# Patient Record
Sex: MALE | Race: AMERICAN INDIAN OR ALASKA NATIVE
[De-identification: names, ages, dates, MRNs, and addresses within clinical notes are randomized per-mention and may not be internally consistent; named-entity substitution may affect disease eponyms.]

---

## 2017-09-13 ENCOUNTER — HOSPITAL ENCOUNTER (OUTPATIENT)
Dept: HOSPITAL 42 - ENDO | Age: 72
Discharge: HOME | End: 2017-09-13
Attending: SPECIALIST
Payer: MEDICARE

## 2017-09-13 VITALS — TEMPERATURE: 97.9 F

## 2017-09-13 VITALS
HEART RATE: 53 BPM | DIASTOLIC BLOOD PRESSURE: 75 MMHG | OXYGEN SATURATION: 98 % | SYSTOLIC BLOOD PRESSURE: 169 MMHG | RESPIRATION RATE: 19 BRPM

## 2017-09-13 VITALS — BODY MASS INDEX: 22.7 KG/M2

## 2017-09-13 DIAGNOSIS — E78.5: ICD-10-CM

## 2017-09-13 DIAGNOSIS — Z12.11: Primary | ICD-10-CM

## 2017-09-13 DIAGNOSIS — K55.20: ICD-10-CM

## 2017-09-13 DIAGNOSIS — I10: ICD-10-CM

## 2017-09-13 DIAGNOSIS — K57.30: ICD-10-CM

## 2017-09-13 DIAGNOSIS — I25.10: ICD-10-CM

## 2017-09-13 DIAGNOSIS — K64.8: ICD-10-CM

## 2017-09-13 DIAGNOSIS — Z95.1: ICD-10-CM

## 2017-09-13 DIAGNOSIS — D64.9: ICD-10-CM

## 2017-09-13 PROCEDURE — 45378 DIAGNOSTIC COLONOSCOPY: CPT

## 2018-07-23 ENCOUNTER — HOSPITAL ENCOUNTER (OUTPATIENT)
Dept: HOSPITAL 42 - CATH | Age: 73
LOS: 1 days | Discharge: HOME | End: 2018-07-24
Attending: INTERNAL MEDICINE
Payer: MEDICARE

## 2018-07-23 VITALS — BODY MASS INDEX: 22.7 KG/M2

## 2018-07-23 VITALS — RESPIRATION RATE: 20 BRPM

## 2018-07-23 DIAGNOSIS — E78.00: ICD-10-CM

## 2018-07-23 DIAGNOSIS — I73.9: ICD-10-CM

## 2018-07-23 DIAGNOSIS — I10: ICD-10-CM

## 2018-07-23 DIAGNOSIS — N40.0: ICD-10-CM

## 2018-07-23 DIAGNOSIS — Z87.891: ICD-10-CM

## 2018-07-23 DIAGNOSIS — I25.110: Primary | ICD-10-CM

## 2018-07-23 DIAGNOSIS — Z79.899: ICD-10-CM

## 2018-07-23 DIAGNOSIS — K21.9: ICD-10-CM

## 2018-07-23 DIAGNOSIS — Z95.1: ICD-10-CM

## 2018-07-23 DIAGNOSIS — Z79.02: ICD-10-CM

## 2018-07-23 DIAGNOSIS — Z82.49: ICD-10-CM

## 2018-07-23 DIAGNOSIS — Z79.82: ICD-10-CM

## 2018-07-23 DIAGNOSIS — R94.39: ICD-10-CM

## 2018-07-23 LAB
APTT BLD: 27.4 SECONDS (ref 25.1–36.5)
BUN SERPL-MCNC: 8 MG/DL (ref 7–21)
CALCIUM SERPL-MCNC: 9.1 MG/DL (ref 8.4–10.5)
ERYTHROCYTE [DISTWIDTH] IN BLOOD BY AUTOMATED COUNT: 13.9 % (ref 11.5–14.5)
GFR NON-AFRICAN AMERICAN: > 60
HGB BLD-MCNC: 12.9 G/DL (ref 14–18)
INR PPP: 1.14 (ref 0.93–1.08)
MCH RBC QN AUTO: 31.9 PG (ref 25–35)
MCHC RBC AUTO-ENTMCNC: 34.8 G/DL (ref 31–37)
MCV RBC AUTO: 91.6 FL (ref 80–105)
PLATELET # BLD: 187 10^3/UL (ref 120–450)
PMV BLD AUTO: 8.7 FL (ref 7–11)
PROTHROMBIN TIME: 13.2 SECONDS (ref 9.4–12.5)
RBC # BLD AUTO: 4.05 10^6/UL (ref 3.5–6.1)
WBC # BLD AUTO: 5.8 10^3/UL (ref 4.5–11)

## 2018-07-23 PROCEDURE — 93459 L HRT ART/GRFT ANGIO: CPT

## 2018-07-23 PROCEDURE — 80048 BASIC METABOLIC PNL TOTAL CA: CPT

## 2018-07-23 PROCEDURE — 93005 ELECTROCARDIOGRAM TRACING: CPT

## 2018-07-23 PROCEDURE — 99152 MOD SED SAME PHYS/QHP 5/>YRS: CPT

## 2018-07-23 PROCEDURE — 86900 BLOOD TYPING SEROLOGIC ABO: CPT

## 2018-07-23 PROCEDURE — 86850 RBC ANTIBODY SCREEN: CPT

## 2018-07-23 PROCEDURE — 99153 MOD SED SAME PHYS/QHP EA: CPT

## 2018-07-23 PROCEDURE — 85730 THROMBOPLASTIN TIME PARTIAL: CPT

## 2018-07-23 PROCEDURE — 36415 COLL VENOUS BLD VENIPUNCTURE: CPT

## 2018-07-23 PROCEDURE — 85027 COMPLETE CBC AUTOMATED: CPT

## 2018-07-23 PROCEDURE — 85025 COMPLETE CBC W/AUTO DIFF WBC: CPT

## 2018-07-23 PROCEDURE — 85610 PROTHROMBIN TIME: CPT

## 2018-07-23 NOTE — CARDCATH
PROCEDURE DATE:  07/23/2018



CARDIAC CATHETERIZATION AND PTCA



HISTORY:  The patient is a 72-year-old male with a history of coronary

artery bypass surgery, hypertension and hypercholesterolemia, who presents

with an abnormal stress test.



Because of this, cardiac catheterization was recommended.



In addition, the patient suffers from peripheral vascular disease.



PROCEDURE:  Left heart catheterization with coronary arteriography, left

ventriculogram, LIMA angiogram, saphenous vein graft angiogram as well as

PTCA and stent of an RCA and PTCA of the saphenous vein graft to the

diagonal vessel of the LAD.



The right femoral artery was cannulated with a 6-Arabic sheath.  There were

no complications.



I performed moderate sedation, which included the presence of an

independent trained observer that assisted in monitoring the patient's

level of consciousness and physiologic status.  After administration of

Versed and fentanyl, my intra service time was 30 minutes.



The findings on catheterization revealed a left ventricle that contracted

normally.  Estimated ejection fraction of 60%.



His coronary arteriography revealed a left main artery that was

unremarkable.



The circumflex artery in the AV groove branch revealed diffuse

atherosclerosis without critical lesions.



OM1 and the posterolateral branch were occluded.



The LAD revealed diffuse atherosclerosis with a patent stent in the

proximal portion.  The diagonal vessel was occluded.



The right coronary artery was selectively cannulized and found to be a

dominant vessel.  The RCA revealed diffuse atherosclerosis with a 90%

stenosis in the midportion.



The LIMA to the LAD was found to be occluded.



The saphenous vein graft to the diagonal vessel revealed a long 80-90%

stenosis in the proximal portion.



The saphenous vein graft to the circumflex 1 and posterolateral branch was

a jump graft with a side-to-end anastomosis to the OM1 as well as an

end-to-side anastomosis to the posterolateral branch.  In the distal

portion of the saphenous vein graft, there was an 80% stenosis noted.



The patient was started on intravenous Angiomax on the fluoroscopic guide,

the guiding catheter was placed in the ostium of the RCA.  An 0.014 ATW

wire was used to cross the RCA lesion.



At 2 balloon was utilized to predilate the RCA lesion at 12 atmospheres of

pressure.



After balloon deflation and removal, a 2.5 x 12 mm drug-eluting stent was

placed and deployed in the RCA lesion and deployed at 14 atmospheres of

pressure.  Repeat coronary arteriography revealed an excellent result with

no residual stenosis and ROB III flow.



The wire was then brought back and the guiding catheter was then placed in

the ostium of the saphenous vein graft to the circumflex system.



The same ATW wire was used to cross the critical lesion in the saphenous

vein graft.



A 3 x 12 mm drug-eluting stent was placed and deployed at 14 atmospheres of

pressure in the saphenous vein graft to the circumflex system.



Repeat coronary arteriography revealed an excellent result with no residual

stenosis and ROB III flow.



The patient tolerated the procedure well.



Manual compression will be used to close the femoral artery site due to

severe peripheral vascular disease.  The patient tolerated the procedure

well.



In summary, the procedure was successful PTCA and stent of an RCA lesion as

well as a PTCA and stent of saphenous vein graft system to the circumflex

system.  Both stents were drug-eluting stents.



Cardiac catheterization revealed normal LV function.



Triple-vessel CAD.



An occluded LIMA to the LAD.



Lesions in the saphenous vein graft to the circumflex system, which was

stented as well as critical lesions in the proximal portion of the

saphenous vein graft to the diagonal vessel.



New lesion in the RCA, which was stented.



Given these findings, the patient will need to remain on aspirin

indefinitely and Plavix for at least a year.  We will bring the patient

back in 1 week for PTCA and stent of the saphenous vein graft to the

diagonal vessel.





__________________________________________

Alberto Hernandez MD





DD:  07/23/2018 9:40:32

DT:  07/23/2018 9:44:23

Job # [id number]

## 2018-07-23 NOTE — CARD
--------------- APPROVED REPORT --------------





Date of service: 07/23/2018



EKG Measurement

Heart [id number]

MS 152P50

[id number]

[id number]

[id number]



<Conclusion>

Sinus bradycardia

Right atrial enlargement

Minimal voltage criteria for LVH, may be normal variant

T wave abnormality, consider lateral ischemia

Abnormal ECG

## 2018-07-23 NOTE — HP
HISTORY OF PRESENT ILLNESS:  

The patient is a 72 year old man with a past medical history of CAD s/p CABG 
who presented for electively scheduled cardiac catheterization after undergoing 
an abnormal nuclear stress test.  The patient was taken to the cardiac 
catheterization lab with Dr. Hernandez where he was found to have multivessel CAD.  
He underwent PCI with stent placement to the mid RCA lesion and tolerated the 
procedure well.  He was subsequently transferred to the telemetry lisa for 
continued post cardiac catheterization care.



PAST MEDICAL HISTORY:  

As per HPI, also HTN, BPH and GERD.



PAST SURGICAL HISTORY:  

As per HPI.



ALLERGIES:  

NKDA.



MEDICATIONS:  

Nexium 20 mg p.o. daily, Aspirin 81 mg p.o. daily, Toprol XL 25 mg p.o. daily, 
Flomax 0.4 mg p.o. daily, Crestor 20 mg p.o. daily and Plavix 75 mg p.o. daily.



FAMILY HISTORY: 

Significant for hypertension, hyperlipidemia and CAD.



SOCIAL HISTORY:  

The patient reports social alcohol use and former smoking history.  He denies 
illicit drug abuse.



REVIEW OF SYSTEMS:  

A 12-point review of systems is negative except as per HPI.



PHYSICAL EXAMINATION:

VITAL SIGNS:  Temperature 98.2, pulse 50, blood pressure 135/70, respiratory 
rate 18, oxygen saturation 99% on room air.

GENERAL:  No apparent distress.

HEENT:  PERRL, EOMI.  No scleral icterus.  No conjunctival pallor.

NECK:  No JVD.  No bruits.

LUNGS:  Clear to auscultation.

CARDIOVASCULAR:  Regular rate and rhythm.  Normal S1 and S2.

ABDOMEN:  Normoactive bowel sounds.  Soft, nontender and nondistended.

EXTREMITIES:  No edema.

NEUROLOGIC:  Awake, alert and oriented x 3.



LABORATORY DATA:  

CBC reviewed and unremarkable.  CMP reviewed and unremarkable.



ASSESSMENT:  

The patient is a 72 year old man with a past medical history of CAD s/p CABG 
who presented for electively scheduled cardiac catheterization after undergoing 
a nuclear stress test which was abnormal and is now s/p PCI with stent 
placement.



PLAN:

1.  CAD s/p CABG s/p PCI with GABRIEL stent placement.  Continue with post 
catheterization care as per Dr. Hernandez.  Continue Aspirin 81 mg p.o. daily, 
Plavix 75 mg p.o. daily and Lipitor 40 mg p.o. daily.

2.  Hypertension.  Blood pressure remains controlled off medications.  We will 
continue to monitor hemodynamics and resume antihypertensives as needed.

3.  BPH.  Resume Flomax 0.4 mg p.o. daily.

4.  GERD.  We will start Pepcid 20 mg p.o. daily.

5.  Prophylaxis.  Continue Pepcid for GI prophylaxis.  DVT prophylaxis is not 
indicated as the patient is ambulatory.





CODE STATUS:  Full code.





__________________________________________

Elier Evans MD





DD:  07/23/2018 10:42:25

DT:  07/23/2018 13:38:07

Job # [id number]

MTDD

## 2018-07-23 NOTE — CARD
--------------- APPROVED REPORT --------------





Date of service: 07/23/2018



EKG Measurement

Heart [id number]

MI 152P51

[id number]

[id number]

[id number]



<Conclusion>

Marked sinus bradycardia with premature atrial complexes

Minimal voltage criteria for LVH, may be normal variant

T wave abnormality, consider anterolateral ischemia

Abnormal ECG

## 2018-07-24 VITALS
OXYGEN SATURATION: 94 % | SYSTOLIC BLOOD PRESSURE: 134 MMHG | DIASTOLIC BLOOD PRESSURE: 69 MMHG | HEART RATE: 78 BPM | TEMPERATURE: 97.9 F

## 2018-07-24 LAB
BASOPHILS # BLD AUTO: 0.02 K/MM3 (ref 0–2)
BASOPHILS NFR BLD: 0.4 % (ref 0–3)
BUN SERPL-MCNC: 7 MG/DL (ref 7–21)
CALCIUM SERPL-MCNC: 8.7 MG/DL (ref 8.4–10.5)
EOSINOPHIL # BLD: 0.1 10*3/UL (ref 0–0.7)
EOSINOPHIL NFR BLD: 2.3 % (ref 1.5–5)
ERYTHROCYTE [DISTWIDTH] IN BLOOD BY AUTOMATED COUNT: 14 % (ref 11.5–14.5)
GFR NON-AFRICAN AMERICAN: > 60
GRANULOCYTES # BLD: 3.8 10*3/UL (ref 1.4–6.5)
GRANULOCYTES NFR BLD: 67.9 % (ref 50–68)
HGB BLD-MCNC: 12.6 G/DL (ref 14–18)
LYMPHOCYTES # BLD: 1.2 10*3/UL (ref 1.2–3.4)
LYMPHOCYTES NFR BLD AUTO: 20.5 % (ref 22–35)
MCH RBC QN AUTO: 31.4 PG (ref 25–35)
MCHC RBC AUTO-ENTMCNC: 34.2 G/DL (ref 31–37)
MCV RBC AUTO: 91.8 FL (ref 80–105)
MONOCYTES # BLD AUTO: 0.5 10*3/UL (ref 0.1–0.6)
MONOCYTES NFR BLD: 8.9 % (ref 1–6)
PLATELET # BLD: 169 10^3/UL (ref 120–450)
PMV BLD AUTO: 8.8 FL (ref 7–11)
RBC # BLD AUTO: 4.01 10^6/UL (ref 3.5–6.1)
WBC # BLD AUTO: 5.6 10^3/UL (ref 4.5–11)

## 2018-07-24 NOTE — PN
SUBJECTIVE:  

The patient was seen and examined at bedside on the telemetry lisa.  No acute 
events overnight.  He remains afebrile, hemodynamically stable and is doing 
well s/p his cardiac catheterization.  This morning he feels well, offers no 
complaints and is looking forward to going home.



OBJECTIVE:

VITAL SIGNS:  Temperature 97.9, pulse 78, blood pressure 134/69, respiratory 
rate 20, oxygen saturation 94% on room air.

GENERAL:  No apparent distress.

HEENT:  PERRL, EOMI.  No scleral icterus.  No conjunctival pallor.

NECK:  No JVD.  No bruits.

LUNGS:  Clear to auscultation.

CARDIOVASCULAR:  Regular rate and rhythm.  Normal S1 and S2.

ABDOMEN:  Normoactive bowel sounds.  Soft, nontender and nondistended.

EXTREMITIES:  No edema.

NEUROLOGIC:  Awake, alert and oriented x 3.  No focal motor deficits.



LABORATORY DATA:  

CBC reviewed and unremarkable.  BMP reviewed and unremarkable.



ASSESSMENT:  

The patient is a 72 year old man with a past medical history of CAD s/p CABG 
who presented for electively scheduled cardiac catheterization after undergoing 
an abnormal nuclear stress test and is now s/p PCI with stent placement.



PLAN:

1.  CAD s/p CABG s/p PCI with GABRIEL stent placement. Continue Aspirin 81 mg p.o. 
daily, Plavix 75 mg p.o. daily and Lipitor 40 mg p.o. daily.

2.  Hypertension.  Resume Toprol-XL 25 mg p.o. daily.

3.  BPH.  Continue Flomax 0.4 mg p.o. daily.

4.  GERD.  Continue Pepcid 20 mg p.o. daily.

5.  Prophylaxis.  Continue Pepcid for GI prophylaxis.  DVT prophylaxis is not 
indicated as the patient is ambulatory.





CODE STATUS:  Full code.





__________________________________________

Elier Evans MD





DD:  07/24/2018 8:14:00

DT:  07/24/2018 8:33:56

Job # [id number]

RENE

## 2018-07-24 NOTE — DS
ADMITTING DIAGNOSIS:  

Unstable angina.



DISCHARGE DIAGNOSIS:  

CAD s/p PCI with GABRIEL stent placement x 2.



SECONDARY DIAGNOSES:  

CAD s/p CABG s/p PCI with stent placement, HTN, BPH and GERD.



CONSULTATION:  

Dr. Hernandez (Cardiology).



IMAGING STUDIES:  

None.



PROCEDURE:  

Cardiac catheterization which demonstrated multivessel CAD with placement of 
GABRIEL to the mid RCA and circumflex lesions.



HISTORY OF PRESENT ILLNESS:  

The patient is a 72 year old man with a past medical history of CAD s/p CABG, 
hypertension, BPH and GERD who presented for electively scheduled cardiac 
catheterization after undergoing an abnormal nuclear stress test.  The patient 
was taken to the cardiac catheterization lab by Dr. Hernandez where he was found to 
have multivessel CAD and underwent successful PCI with drug-eluting stent 
placement to the mid RCA and circumflex lesions.  The patient tolerated the 
procedure well and no post procedure complications were noted and he was 
subsequently transferred to the telemetry lisa for continued post cardiac 
catheterization care.



HOSPITAL COURSE:  

While in the telemetry lisa the patient was maintained on his home medications.
  His hospital stay was unremarkable and the following day he was noted to 
ambulate around the telemetry lisa with no cardiopulmonary symptoms.  After 
evaluation with Dr. Hernandez he was cleared for discharge to home.



CONDITION:  

Good, improved.



DISPOSITION:  

Home.



DISCHARGE MEDICATIONS:  

Aspirin 81 mg p.o. daily, Plavix 75 mg p.o. daily, Crestor 20 mg p.o. daily, 
Toprol XL 25 mg p.o. daily, Flomax 0.4 mg p.o. daily and Pepcid 20 mg p.o. 
daily.



DISCHARGE INSTRUCTIONS:  

The patient was advised to adhere to post cardiac catheterization instructions 
as per Dr. Hernandez.  He was also advised that if he develops any chest pain, 
palpitations, fevers, chills, rigors, pain or bleeding at his cardiac 
catheterization site to present to his PMD or to the nearest ED immediately.



FOLLOWUP:  

The patient is to follow up with his PMD within 1 week of discharge.  The 
patient is to follow up with Dr. Hernandez as scheduled.





__________________________________________

Elier Evans MD



DD:  07/24/2018 15:04:07

DT:  07/24/2018 18:58:22

Job # [id number]

RENE

## 2018-07-24 NOTE — PN
DATE:  07/24/2018



CARDIOLOGY FOLLOWUP



SUBJECTIVE:  The patient is chest pain free.



OBJECTIVE:

VITAL SIGNS:  Blood pressure is 134/69, heart rate is in the 70s.

NECK:  Negative JVD.

LUNGS:  Without rales.

HEART:  Reveal S1, S2.

EXTREMITIES:  Without edema.  The right groin site is stable.



DATA:  Hemoglobin is 12.6.  Chemistries:  BUN and creatinine are

unremarkable.



IMPRESSION:

1.  Stable post percutaneous transluminal coronary angioplasty and stent.

2.  History of coronary artery bypass surgery.

3.  Triple-vessel coronary artery disease.

4.  Hypercholesterolemia.



Given these findings, the patient is doing well.



The patient is scheduled for discharge today.



The patient will go home on aspirin and clopidogrel.



The patient will return next week for PTCA and stent of bifurcating

saphenous vein grafts.







__________________________________________

Alberto Hernandez MD





DD:  07/24/2018 8:06:23

DT:  07/24/2018 8:08:57

Job # [id number]

## 2018-08-02 ENCOUNTER — HOSPITAL ENCOUNTER (OUTPATIENT)
Dept: HOSPITAL 42 - CATH | Age: 73
LOS: 1 days | Discharge: HOME | End: 2018-08-03
Attending: INTERNAL MEDICINE
Payer: MEDICARE

## 2018-08-02 VITALS — BODY MASS INDEX: 22.7 KG/M2

## 2018-08-02 DIAGNOSIS — N40.0: ICD-10-CM

## 2018-08-02 DIAGNOSIS — I25.10: Primary | ICD-10-CM

## 2018-08-02 DIAGNOSIS — K21.9: ICD-10-CM

## 2018-08-02 DIAGNOSIS — Z95.5: ICD-10-CM

## 2018-08-02 DIAGNOSIS — Z95.1: ICD-10-CM

## 2018-08-02 DIAGNOSIS — Z82.49: ICD-10-CM

## 2018-08-02 DIAGNOSIS — Z87.891: ICD-10-CM

## 2018-08-02 DIAGNOSIS — I10: ICD-10-CM

## 2018-08-02 LAB
ALBUMIN SERPL-MCNC: 4.4 G/DL (ref 3–4.8)
ALBUMIN/GLOB SERPL: 1.1 {RATIO} (ref 1.1–1.8)
ALT SERPL-CCNC: 31 U/L (ref 7–56)
APTT BLD: 26.7 SECONDS (ref 25.1–36.5)
AST SERPL-CCNC: 38 U/L (ref 17–59)
BASOPHILS # BLD AUTO: 0.03 K/MM3 (ref 0–2)
BASOPHILS NFR BLD: 0.5 % (ref 0–3)
BUN SERPL-MCNC: 10 MG/DL (ref 7–21)
CALCIUM SERPL-MCNC: 9.3 MG/DL (ref 8.4–10.5)
EOSINOPHIL # BLD: 0.1 10*3/UL (ref 0–0.7)
EOSINOPHIL NFR BLD: 2.4 % (ref 1.5–5)
ERYTHROCYTE [DISTWIDTH] IN BLOOD BY AUTOMATED COUNT: 14 % (ref 11.5–14.5)
GFR NON-AFRICAN AMERICAN: > 60
GRANULOCYTES # BLD: 4.16 10*3/UL (ref 1.4–6.5)
GRANULOCYTES NFR BLD: 70 % (ref 50–68)
HGB BLD-MCNC: 13.5 G/DL (ref 14–18)
INR PPP: 1.15
LYMPHOCYTES # BLD: 1.1 10*3/UL (ref 1.2–3.4)
LYMPHOCYTES NFR BLD AUTO: 19 % (ref 22–35)
MCH RBC QN AUTO: 32.1 PG (ref 25–35)
MCHC RBC AUTO-ENTMCNC: 34.9 G/DL (ref 31–37)
MCV RBC AUTO: 91.9 FL (ref 80–105)
MONOCYTES # BLD AUTO: 0.5 10*3/UL (ref 0.1–0.6)
MONOCYTES NFR BLD: 8.1 % (ref 1–6)
PLATELET # BLD: 196 10^3/UL (ref 120–450)
PMV BLD AUTO: 8.6 FL (ref 7–11)
PROTHROMBIN TIME: 13.3 SECONDS (ref 9.4–12.5)
RBC # BLD AUTO: 4.21 10^6/UL (ref 3.5–6.1)
WBC # BLD AUTO: 5.9 10^3/UL (ref 4.5–11)

## 2018-08-02 PROCEDURE — 99152 MOD SED SAME PHYS/QHP 5/>YRS: CPT

## 2018-08-02 PROCEDURE — 99153 MOD SED SAME PHYS/QHP EA: CPT

## 2018-08-02 PROCEDURE — 80048 BASIC METABOLIC PNL TOTAL CA: CPT

## 2018-08-02 PROCEDURE — 80053 COMPREHEN METABOLIC PANEL: CPT

## 2018-08-02 PROCEDURE — 85610 PROTHROMBIN TIME: CPT

## 2018-08-02 PROCEDURE — 85025 COMPLETE CBC W/AUTO DIFF WBC: CPT

## 2018-08-02 PROCEDURE — 86850 RBC ANTIBODY SCREEN: CPT

## 2018-08-02 PROCEDURE — 85730 THROMBOPLASTIN TIME PARTIAL: CPT

## 2018-08-02 PROCEDURE — 86900 BLOOD TYPING SEROLOGIC ABO: CPT

## 2018-08-02 PROCEDURE — 93005 ELECTROCARDIOGRAM TRACING: CPT

## 2018-08-02 PROCEDURE — 36415 COLL VENOUS BLD VENIPUNCTURE: CPT

## 2018-08-02 NOTE — HP
HISTORY OF PRESENT ILLNESS:  

The patient is a 72 year old man with a past medical history of CAD s/p CABG 
who presented for electively scheduled staged cardiac catheterization after 
undergoing an abnormal nuclear stress test.  The patient was taken to the 
cardiac catheterization lab 1 week ago with Dr. Hernandez where he underwent 
successful PCI with stent placement to the mid RCA lesion and circumflex 
lesion.  The patient was advised at that time that he will need to return for 
staged PCI.  He was taken to the cardiac catheterization lab with Dr. Hernandez 
today where he underwent PCI with stent placement of SVG to diagonal vessel and 
was subsequently transferred to the telemetry lisa for continued post cardiac 
catheterization care.



PAST MEDICAL HISTORY:  

As per HPI, also HTN, BPH and GERD.



PAST SURGICAL HISTORY:  

As per HPI.



ALLERGIES:  

NKDA.



MEDICATIONS:  

Nexium 20 mg p.o. daily, Aspirin 81 mg p.o. daily, Toprol-XL 25 mg p.o. daily, 
Flomax 0.4 mg p.o. daily, Crestor 20 mg p.o. daily and Plavix 75 mg p.o. daily.



FAMILY HISTORY:  

Significant for hypertension, hyperlipidemia and CAD.



SOCIAL HISTORY:  

The patient reports social alcohol use and former smoking history.  He denies 
illicit drug abuse.



REVIEW OF SYSTEMS:  

A 12-point review of systems is negative except as per HPI.



PHYSICAL EXAMINATION:

VITAL SIGNS:  Temperature 97.7, pulse 52, blood pressure 128/80, respiratory 
rate 18, oxygen saturation 99% on room air.

GENERAL:  No apparent distress.

HEENT:  PERRL, EOMI.  No scleral icterus.  No conjunctival pallor.

NECK:  No JVD.  No bruits.

LUNGS:  Clear to auscultation.

CARDIOVASCULAR:  Regular rate and rhythm.  Normal S1 and S2.

ABDOMEN:  Normoactive bowel sounds.  Soft, nontender and nondistended.

EXTREMITIES:  No edema.

NEUROLOGIC:  Awake, alert and oriented x 3.  No focal motor deficits.



LABORATORY DATA:  

CBC reviewed and unremarkable.  CMP reviewed and unremarkable.



ASSESSMENT:  

The patient is a 72 year old man with past medical history of CAD s/p CABG s/p 
PCI with stent placement who presented for electively scheduled staged cardiac 
catheterization after undergoing an abnormal nuclear stress test.



PLAN:

1.  CAD s/p CABG s/p PCI with stent placement.  Continue post cardiac 
catheterization care as per Dr. Hernandez.  Resume aspirin 81 mg

p.o. daily, Plavix 75 mg p.o. daily and Lipitor 40 mg p.o. daily.

2.  Hypertension.  Blood pressure controlled.  Continue Toprol-XL 25 mg p.o. 
daily.

3.  BPH.  The patient to resume Flomax 0.4 mg p.o. daily upon discharge.

4.  GERD.

5.  Prophylaxis.  GI prophylaxis is not indicated as the patient is eating. DVT 
prophylaxis is not indicated as the patient is ambulatory.





CODE STATUS:  Full code.





__________________________________________

Elier Evans MD





DD:  08/02/2018 9:40:52

DT:  08/02/2018 13:32:21

Job # [id number]

MTDD

## 2018-08-02 NOTE — CARDCATH
Copied To:  Alberto Hernandez MD

Attending MD:  Alberto Hernandez MD



PROCEDURE DATE:  08/02/2018



CARDIAC CATH AND PTCA



HISTORY:  The patient is a 72-year-old male with multiple cardiac risk

factors including coronary artery bypass surgery, who presented with chest

pain and abnormal stress test.  He was found to have multiple critical

lesions on his catheterization last week.  He is status post PTCA and stent

of an RCA lesion as well as PTCA and stent of a saphenous vein graft to the

OM system performed last week.



He presents for PTCA and stent of a 90% long critical lesion of the

saphenous vein graft to a diagonal vessel of the LAD.



The left femoral artery was cannulated with a 6-Guamanian sheath.  There were

no complications.



I performed moderate sedation, which included the presence of an

independent trained observer that assisted in monitoring the patient's

level of consciousness and physiologic status.  After administration of

Versed and fentanyl, my intra service time was 30 minutes.



The findings on catheterization revealed a right dominant circulation.



The RCA revealed a patent stent that was placed last week.  The results

were excellent.



The saphenous vein graft to the circumflex system revealed a patent stent

in the midportion of the saphenous vein graft.



The saphenous vein graft to the diagonal vessel revealed a long 90%

stenosis in its proximal portion.



The patient was started on intravenous Angiomax on the fluoroscopic guide,

the guiding catheter was placed in the ostium of the saphenous vein graft

to the diagonal vessel of the LAD.



An 0.014 ATW wire was used to cross the lesion.



A 2 balloon was utilized to predilate the lesion.  This was followed by

deployment of a 2.5 x 23 mm drug-eluting stent.



Immediately after deployment, there was a slow-flow phenomenon, which was

relieved with intracoronary nitroglycerin 200 mcg.



Postdilatation was performed with a 3 balloon.



Repeat coronary arteriography revealed an excellent result with ROB III

flow down the saphenous vein graft to the diagonal vessel.



The patient tolerated the procedure well.  Angio-Seal was used to close the

femoral artery site.



In summary, the procedure was successful PTCA and stent of a 90% stenoses

of the saphenous vein graft to the diagonal vessel of the LAD.



Cardiac catheterization reveals a patent stent in the right dominant

circulation that was placed last week.



The saphenous vein graft to the circumflex system was patent with a stent

that was placed 1 week ago.



Given these findings, the patient will need to remain on aspirin

indefinitely and Plavix for at least a year and undergo a cardiac risk

reduction program.





__________________________________________

Alberto Hernandez MD





DD:  08/02/2018 9:50:26

DT:  08/02/2018 9:53:13

Job # [id number]

## 2018-08-02 NOTE — CARD
--------------- APPROVED REPORT --------------





Date of service: 08/02/2018



EKG Measurement

Heart [id number]

IA 156P39

[id number]

[id number]

[id number]



<Conclusion>

Sinus bradycardia

Minimal voltage criteria for LVH, may be normal variant

T wave abnormality, consider anterolateral ischemia

Abnormal ECG

## 2018-08-02 NOTE — CARD
--------------- APPROVED REPORT --------------





Date of service: 08/02/2018



EKG Measurement

Heart [id number]

VT 156P61

[id number]

[id number]

[id number]



<Conclusion>

Sinus bradycardia

T wave abnormality, consider anterior ischemia

Abnormal ECG

## 2018-08-03 VITALS — HEART RATE: 60 BPM

## 2018-08-03 VITALS — TEMPERATURE: 98.2 F | DIASTOLIC BLOOD PRESSURE: 78 MMHG | SYSTOLIC BLOOD PRESSURE: 123 MMHG | OXYGEN SATURATION: 97 %

## 2018-08-03 VITALS — RESPIRATION RATE: 20 BRPM

## 2018-08-03 LAB
BASOPHILS # BLD AUTO: 0.02 K/MM3 (ref 0–2)
BASOPHILS NFR BLD: 0.3 % (ref 0–3)
BUN SERPL-MCNC: 9 MG/DL (ref 7–21)
CALCIUM SERPL-MCNC: 8.7 MG/DL (ref 8.4–10.5)
EOSINOPHIL # BLD: 0.1 10*3/UL (ref 0–0.7)
EOSINOPHIL NFR BLD: 2.2 % (ref 1.5–5)
ERYTHROCYTE [DISTWIDTH] IN BLOOD BY AUTOMATED COUNT: 13.8 % (ref 11.5–14.5)
GFR NON-AFRICAN AMERICAN: > 60
GRANULOCYTES # BLD: 3.94 10*3/UL (ref 1.4–6.5)
GRANULOCYTES NFR BLD: 67.2 % (ref 50–68)
HGB BLD-MCNC: 12.2 G/DL (ref 14–18)
LYMPHOCYTES # BLD: 1.1 10*3/UL (ref 1.2–3.4)
LYMPHOCYTES NFR BLD AUTO: 18.9 % (ref 22–35)
MCH RBC QN AUTO: 31.9 PG (ref 25–35)
MCHC RBC AUTO-ENTMCNC: 34.9 G/DL (ref 31–37)
MCV RBC AUTO: 91.4 FL (ref 80–105)
MONOCYTES # BLD AUTO: 0.7 10*3/UL (ref 0.1–0.6)
MONOCYTES NFR BLD: 11.4 % (ref 1–6)
PLATELET # BLD: 182 10^3/UL (ref 120–450)
PMV BLD AUTO: 8.7 FL (ref 7–11)
RBC # BLD AUTO: 3.83 10^6/UL (ref 3.5–6.1)
WBC # BLD AUTO: 5.9 10^3/UL (ref 4.5–11)

## 2018-08-03 NOTE — PN
Copied To:  Alberto Hernandez MD

Attending MD:  Alberto Hernandez MD



DATE:  08/03/2018



CARDIOLOGY FOLLOWUP



SUBJECTIVE:  The patient is chest pain free.  No shortness of breath.



PHYSICAL EXAMINATION:

VITAL SIGNS:  Blood pressure is 123/78, heart rate is in the 60s.

NECK:  Negative JVD.

LUNGS:  Without rales.

HEART:  Reveals S1, S2.

EXTREMITIES:  Without edema.  Laboratories were reviewed and found to be

unremarkable.



IMPRESSION:

1.  Stable post percutaneous transluminal coronary angioplasty and stent of

the saphenous vein graft to the diagonal vessel of the left anterior

descending.

2.  Multivessel percutaneous transluminal coronary angioplasty.

3.  History of coronary artery bypass surgery.

4.  Hypercholesterolemia.

5.  Hypertension.



PLAN:  Given these findings, the patient is stable for discharge.  Followup

and instructions have been given to the patient in detail.





__________________________________________

Alberto Hernandez MD





DD:  08/03/2018 9:00:04

DT:  08/03/2018 9:03:02

Job # [id number]

## 2018-08-03 NOTE — CARD
--------------- APPROVED REPORT --------------





Date of service: 08/03/2018



EKG Measurement

Heart [id number]

AK 150P51

[id number]

[id number]

[id number]



<Conclusion>

Sinus bradycardia with premature atrial complexes

ST & Marked T wave abnormality, consider anterolateral ischemia

Abnormal ECG

## 2018-08-04 NOTE — DS
Copied To:  Jomar Evans MD

Attending MD:  Jomar Evans MD



HISTORY OF PRESENT ILLNESS:  Patient is a 72-year-old white male who came

and was admitted to the hospital for a cardiac cath secondary to a positive

stress test.  The patient had a cardiac cath by Dr. Alberto Hernandez which showed

that he had coronary artery disease and a stent of the saphenous vein graft

to the diagonal vessel of the left anterior descending artery, multivessel

percutaneous transluminal coronary angioplasty, history of coronary artery

bypass surgery.  The patient is lying comfortably in bed.  He denies any

symptoms at this time and there have been no acute events overnight.



PHYSICAL EXAMINATION:

VITAL SIGNS:  A pulse rate of 57, blood pressure of 123/78, respiration

rate of 20 with an O2 saturation of 97% on room air.

HEENT:  Negative.

NECK:  Supple.  No bruits appreciated.

LUNGS:  Clear bilaterally.

HEART:  Shows a regular rate and rhythm.  No murmurs.

ABDOMEN:  Benign.

NEUROLOGIC:  The patient is intact.



LABORATORY DATA:  CBC shows a hemoglobin of 12.2 with a hematocrit of 35. 

Chemistry is entirely unremarkable.



IMPRESSION:  At this time is coronary artery disease, hypertension, and

benign prostatic hypertrophy.



PLAN:  The patient will be followed on an outpatient basis.







__________________________________________

Jomar Evans MD





DD:  08/03/2018 20:30:44

DT:  08/04/2018 2:59:05

Job # [id number]